# Patient Record
Sex: FEMALE | Race: WHITE | NOT HISPANIC OR LATINO | Employment: PART TIME | URBAN - METROPOLITAN AREA
[De-identification: names, ages, dates, MRNs, and addresses within clinical notes are randomized per-mention and may not be internally consistent; named-entity substitution may affect disease eponyms.]

---

## 2024-04-01 ENCOUNTER — HOSPITAL ENCOUNTER (EMERGENCY)
Facility: HOSPITAL | Age: 59
Discharge: HOME | End: 2024-04-01
Attending: EMERGENCY MEDICINE | Admitting: EMERGENCY MEDICINE
Payer: COMMERCIAL

## 2024-04-01 ENCOUNTER — APPOINTMENT (OUTPATIENT)
Dept: GENERAL RADIOLOGY | Facility: HOSPITAL | Age: 59
End: 2024-04-01
Payer: COMMERCIAL

## 2024-04-01 VITALS
HEIGHT: 69 IN | OXYGEN SATURATION: 94 % | RESPIRATION RATE: 18 BRPM | TEMPERATURE: 97.1 F | SYSTOLIC BLOOD PRESSURE: 154 MMHG | WEIGHT: 165 LBS | DIASTOLIC BLOOD PRESSURE: 90 MMHG | HEART RATE: 69 BPM | BODY MASS INDEX: 24.44 KG/M2

## 2024-04-01 DIAGNOSIS — S62.102A LEFT WRIST FRACTURE, CLOSED, INITIAL ENCOUNTER: Primary | ICD-10-CM

## 2024-04-01 PROCEDURE — 25810000003 SODIUM CHLORIDE 0.9 % SOLUTION: Performed by: EMERGENCY MEDICINE

## 2024-04-01 PROCEDURE — 73100 X-RAY EXAM OF WRIST: CPT

## 2024-04-01 PROCEDURE — 25010000002 FENTANYL CITRATE (PF) 50 MCG/ML SOLUTION: Performed by: EMERGENCY MEDICINE

## 2024-04-01 PROCEDURE — 96374 THER/PROPH/DIAG INJ IV PUSH: CPT

## 2024-04-01 PROCEDURE — 99285 EMERGENCY DEPT VISIT HI MDM: CPT

## 2024-04-01 PROCEDURE — 25010000002 PROPOFOL 10 MG/ML EMULSION: Performed by: EMERGENCY MEDICINE

## 2024-04-01 PROCEDURE — 73110 X-RAY EXAM OF WRIST: CPT

## 2024-04-01 RX ORDER — HYDROCODONE BITARTRATE AND ACETAMINOPHEN 5; 325 MG/1; MG/1
1 TABLET ORAL EVERY 6 HOURS PRN
Qty: 12 TABLET | Refills: 0 | Status: SHIPPED | OUTPATIENT
Start: 2024-04-01

## 2024-04-01 RX ORDER — PROPOFOL 10 MG/ML
40 VIAL (ML) INTRAVENOUS ONCE
Status: DISCONTINUED | OUTPATIENT
Start: 2024-04-01 | End: 2024-04-01 | Stop reason: HOSPADM

## 2024-04-01 RX ORDER — PROPOFOL 10 MG/ML
VIAL (ML) INTRAVENOUS
Status: COMPLETED | OUTPATIENT
Start: 2024-04-01 | End: 2024-04-01

## 2024-04-01 RX ORDER — FENTANYL CITRATE 50 UG/ML
50 INJECTION, SOLUTION INTRAMUSCULAR; INTRAVENOUS ONCE
Status: COMPLETED | OUTPATIENT
Start: 2024-04-01 | End: 2024-04-01

## 2024-04-01 RX ADMIN — FENTANYL CITRATE 50 MCG: 50 INJECTION, SOLUTION INTRAMUSCULAR; INTRAVENOUS at 13:45

## 2024-04-01 RX ADMIN — FENTANYL CITRATE 50 MCG: 50 INJECTION, SOLUTION INTRAMUSCULAR; INTRAVENOUS at 13:18

## 2024-04-01 RX ADMIN — PROPOFOL 20 MG: 10 INJECTION, EMULSION INTRAVENOUS at 14:25

## 2024-04-01 RX ADMIN — PROPOFOL 40 MG: 10 INJECTION, EMULSION INTRAVENOUS at 14:23

## 2024-04-01 RX ADMIN — SODIUM CHLORIDE 1000 ML: 9 INJECTION, SOLUTION INTRAVENOUS at 14:10

## 2024-04-01 NOTE — ED PROVIDER NOTES
EMERGENCY DEPARTMENT ENCOUNTER    Room Number:  40/40  PCP: Marilu Crain APRN  Historian: Patient      HPI:  Chief Complaint: Left wrist pain  A complete HPI/ROS/PMH/PSH/SH/FH are unobtainable due to: None    Context: Andria Downey is a 58 y.o. female who presents to the ED via EMS from home c/o acute left wrist pain after she fell and pressures over the left hand.  Deformity noted per EMS.  No pain meds given prior to arrival.  Patient denies any other injuries or concerns.  Denies being on any anticoagulation.      MEDICAL RECORD REVIEW    External (non-ED) record review: No medication allergies listed on chart review in epic          BLAZE query complete and reviewed. Patient receives regular prescriptions for controlled substances. She receives Valium for anxiety, discussed not mixing opioids with valium.     PAST MEDICAL HISTORY  Active Ambulatory Problems     Diagnosis Date Noted    No Active Ambulatory Problems     Resolved Ambulatory Problems     Diagnosis Date Noted    No Resolved Ambulatory Problems     No Additional Past Medical History         PAST SURGICAL HISTORY  No past surgical history on file.      FAMILY HISTORY  No family history on file.      SOCIAL HISTORY  Social History     Socioeconomic History    Marital status:          ALLERGIES  Patient has no known allergies.        REVIEW OF SYSTEMS  Review of Systems     All systems reviewed and negative except for those discussed in HPI.       PHYSICAL EXAM    I have reviewed the triage vital signs and nursing notes.    ED Triage Vitals   Temp Heart Rate Resp BP SpO2   04/01/24 1256 04/01/24 1254 04/01/24 1254 04/01/24 1254 04/01/24 1254   97.1 °F (36.2 °C) 59 18 (!) 138/112 96 %      Temp src Heart Rate Source Patient Position BP Location FiO2 (%)   -- -- -- -- --              Physical Exam  General: No acute distress, nontoxic  HEENT: Mucous membranes moist, atraumatic, EOMI  Neck: Full ROM  Pulm: Symmetric chest rise, nonlabored,  lungs CTAB  Cardiovascular: Regular rate and rhythm, intact distal pulses  MSK: Left wrist edema and deformity dorsally with dorsal hematoma  Skin: Warm, dry  Neuro: Awake, alert, oriented x 4, GCS 15, neurovascular intact distal left upper extremity, moving all extremities, no focal deficits  Psych: Calm, cooperative        LAB RESULTS  No results found for this or any previous visit (from the past 24 hour(s)).    Ordered the above labs and independently interpreted results. My findings will be discussed in the medical decision making section below        RADIOLOGY  XR Wrist 3+ View Left    Result Date: 4/1/2024  XR WRIST 3+ VW LEFT-  Clinical: Post reduction, fell  COMPARISON examination earlier in the day at 1:46 p.m., current examination at 2:36 p.m.  FINDINGS: There is near anatomic alignment demonstrated at the distal ulnar fracture site. Considerably improved alignment of the radial fracture fragment. No carpal bone fracture or dissociation seen. First carpometacarpal joint degeneration. There is casting material in position. The remainder is unremarkable.  This report was finalized on 4/1/2024 2:52 PM by Dr. Howard Young M.D on Workstation: [id number]      XR Wrist 2 View Left    Result Date: 4/1/2024  2 VIEW LEFT WRIST  HISTORY: Fell. Pain.  There is a fracture of the distal radial metaphysis with complete volar displacement of the epiphysis relative to the radial shaft. There is also a nondisplaced fracture at the junction of the distal shaft of the ulna with the ulnar metaphysis.  This report was finalized on 4/1/2024 2:03 PM by Dr. Willi Pimentel M.D on Workstation: [id number]       Ordered the above noted radiological studies.  Independently interpreted by me and my independent review of findings can be found in the ED Course.  See dictation for official radiology interpretation.      PROCEDURES    Procedural Sedation    Date/Time: 4/1/2024 2:47 PM    Performed by: Zack Curry MD  Authorized by:  Zack Curry MD    Consent:     Consent obtained:  Verbal and written    Consent given by:  Patient    Risks, benefits, and alternatives were discussed: yes      Risks discussed:  Allergic reaction, dysrhythmia, inadequate sedation, prolonged hypoxia resulting in organ damage, prolonged sedation necessitating reversal, nausea, respiratory compromise necessitating ventilatory assistance and intubation and vomiting    Alternatives discussed:  Analgesia without sedation  Universal protocol:     Procedure explained and questions answered to patient or proxy's satisfaction: yes      Relevant documents present and verified: yes      Test results available: yes      Imaging studies available: yes      Required blood products, implants, devices, and special equipment available: yes      Immediately prior to procedure, a time out was called: yes      Patient identity confirmed:  Verbally with patient  Indications:     Procedure performed:  Fracture reduction    Procedure necessitating sedation performed by: MARGARITO Rios.    Intended level of sedation:  Moderate  Pre-sedation assessment:     NPO status caution: urgency dictates proceeding with non-ideal NPO status      ASA classification: class 1 - normal, healthy patient      Mallampati score:  II - soft palate, uvula, fauces visible    Neck mobility: normal      Pre-sedation assessments completed and reviewed: airway patency, anesthesia/sedation history, cardiovascular function, hydration status, mental status, nausea/vomiting, pain level, respiratory function and temperature      History of difficult intubation: no      Pre-sedation assessment completed:  4/1/2024 2:10 PM  Immediate pre-procedure details:     Reassessment: Patient reassessed immediately prior to procedure      Reviewed: vital signs and relevant labs/tests      Verified: bag valve mask available, emergency equipment available, intubation equipment available, IV patency confirmed, oxygen available,  reversal medications available and suction available    Procedure details (see MAR for exact dosages):     Sedation start time:  4/1/2024 2:23 PM    Preoxygenation:  Nasal cannula    Sedation:  Propofol    Analgesia:  Fentanyl    Intra-procedure monitoring:  Blood pressure monitoring, cardiac monitor, continuous capnometry, continuous pulse oximetry, frequent LOC assessments and frequent vital sign checks    Intra-procedure events: none      Sedation end time:  4/1/2024 2:35 PM    Total sedation time (minutes):  12  Post-procedure details:     Post-sedation assessment completed:  4/1/2024 2:49 PM    Attendance: Constant attendance by certified staff until patient recovered      Recovery: Patient returned to pre-procedure baseline      Complications:  None    Post-sedation assessments completed and reviewed: airway patency, cardiovascular function, hydration status, mental status, nausea/vomiting, pain level and respiratory function      Patient is stable for discharge or admission: yes      Procedure completion:  Tolerated well, no immediate complications          MEDICATIONS GIVEN IN ER    Medications   Propofol (DIPRIVAN) injection 40 mg (40 mg Intravenous Not Given 4/1/24 1432)   fentaNYL citrate (PF) (SUBLIMAZE) injection 50 mcg (50 mcg Intravenous Given 4/1/24 1318)   fentaNYL citrate (PF) (SUBLIMAZE) injection 50 mcg (50 mcg Intravenous Given 4/1/24 1345)   sodium chloride 0.9 % bolus 1,000 mL (1,000 mL Intravenous New Bag 4/1/24 1410)   Propofol (DIPRIVAN) injection (20 mg Intravenous Given 4/1/24 1425)         PROGRESS, DATA ANALYSIS, CONSULTS, AND MEDICAL DECISION MAKING    Please note that this section constitutes my independent interpretation of clinical data including lab results, radiology, EKG's.  This constitutes my independent professional opinion regarding differential diagnosis and management of this patient.  It may include any factors such as history from outside sources, review of external  records, social determinants of health, management of medications, response to those treatments, and discussions with other providers.    Differential Diagnosis and Plan: Initial concern for left wrist fracture/dislocation/soft tissue contusion.  Plan for pain control, x-ray, and reevaluation with results.    Additional sources:  - Discussed/ obtained information from independent historians: EMS reports they did not give any pain medicines prior to arrival     - (Social Determinants of Health): None     - Shared decision making:  Patient fully updated on and in agreement with the course and plan moving forward    ED Course as of 04/01/24 1517   Mon Apr 01, 2024   1352 XR Wrist 2 View Left  Per my independent interpretation of the left wrist x-ray, displaced distal radius fracture with likely a distal ulnar fracture as well [DC]   1442 XR Wrist 3+ View Left  Per my independent interpretation of the postreduction x-ray, much improved alignment [DC]   1450 Patient has emerged from sedation with no issues, she is well-appearing in no acute distress with improved pain.  Safe for discharge with outpatient orthopedic follow-up.  ED return for worsening symptoms as needed. [DC]      ED Course User Index  [DC] Zack Curry MD       Hospitalization Considered?:     Orders Placed During This Visit:  Orders Placed This Encounter   Procedures    FX Dislocation Initial    Procedural Sedation    [facility] D[facility] 02.  Shoulder Immobilizer/Sling    XR Wrist 2 View Left    XR Wrist 3+ View Left       Additional orders considered but not placed:      Independent interpretation of labs, radiology studies, and discussions with consultants: See ED Course        AS OF 15:17 EDT VITALS:    BP - 128/87  HR - 85  TEMP - 97.1 °F (36.2 °C)  02 SATS - 96%          DIAGNOSIS  Final diagnoses:   Left wrist fracture, closed, initial encounter         DISPOSITION  ED Disposition       ED Disposition   Discharge    Condition   Stable     Comment   --                Please note that portions of this document were completed with a voice recognition program.    Note Disclaimer: At [facility], we believe that sharing information builds trust and better relationships. You are receiving this note because you recently visited [facility]. It is possible you will see health information before a provider has talked with you about it. This kind of information can be easy to misunderstand. To help you fully understand what it means for your health, we urge you to discuss this note with your provider.                       Zack Curry MD  04/01/24 1072

## 2024-04-01 NOTE — ED PROVIDER NOTES
FX Dislocation    Date/Time: 4/1/2024 2:36 PM    Performed by: Salinas Camap PA  Authorized by: Zack Curry MD    Consent:     Consent obtained:  Verbal    Consent given by:  Patient    Risks, benefits, and alternatives were discussed: yes      Risks discussed:  Nerve damage and pain    Alternatives discussed:  No treatment  Universal protocol:     Imaging studies available: yes      Patient identity confirmed:  Verbally with patient  Injury:     Injury location:  Forearm    Forearm fracture type: distal radius and ulnar styloid    Pre-procedure details:     Distal neurologic exam:  Normal    Distal perfusion: distal pulses strong      Range of motion: reduced    Sedation:     Sedation type:  Moderate sedation  Anesthesia:     Anesthesia method:  None  Procedure details:     Skeletal traction used: yes      Immobilization:  Splint    Splint type:  Sugar tong    Supplies used:  Elastic bandage and fiberglass    Attestation: Splint applied and adjusted personally by me    Post-procedure details:     Distal neurologic exam:  Normal    Distal perfusion: distal pulses strong and brisk capillary refill      Range of motion: unchanged      Procedure completion:  Tolerated well, no immediate complications         Salinas Campa PA  04/01/24 1435

## 2024-04-01 NOTE — ED TRIAGE NOTES
Patient to ED via EMS from home. Patient had a mechanical fall and braced herself with her left hand. Patient has left wrist injury. Patient denies hitting head or LOC.

## 2024-04-01 NOTE — DISCHARGE INSTRUCTIONS
Take pain medications as needed for severe pain, do not mix with Valium, follow-up with orthopedics as discussed, PCP follow-up as needed, ED return for worsening symptoms as needed.

## 2025-03-10 NOTE — PROGRESS NOTES
"Chief Complaint  Colon Cancer Screening    Subjective        Andria Downey is a 59 year-old female, new to me and to our practice.  She was referred to our GI service for colorectal cancer screening     History of Present Illness  The patient is a 59-year-old female who presents as a new patient for colorectal cancer screening.    She was referred to our facility by her primary care physician for a colonoscopy screening. She has previously undergone a Cologuard study a few years ago but has not had a colonoscopy in a long time. She reports no family history of colon cancer and no changes in bowel movements such as hard stools, constipation, loose stools, or rectal bleeding. Her stools are slightly soft. She also reports no abdominal pain, nausea, vomiting, or unintentional weight loss. She is currently attempting to lose 10 pounds prior to returning to [place]. She does not consume alcohol.    She has a history of COPD and smokes daily. She also vapes and smokes marijuana.    She has a history of hypertension and is taking lisinopril.    SOCIAL HISTORY  She works as a . She admits to smoking and vaping daily. She also smokes marijuana. She does not consume alcohol.    FAMILY HISTORY  She reports no family history of colon cancer.    MEDICATIONS  Current: lisinopril       Results Reviewed:  (+) cologuard in 2023 - Colonoscopy was scheduled but patient canceled.     Objective   Vital Signs:  /80   Temp 98.2 °F (36.8 °C)   Ht 175.3 cm (69\")   Wt 77.8 kg (171 lb 8 oz)   BMI 25.33 kg/m²   Estimated body mass index is 25.33 kg/m² as calculated from the following:    Height as of this encounter: 175.3 cm (69\").    Weight as of this encounter: 77.8 kg (171 lb 8 oz).       BMI is within normal parameters. No other follow-up for BMI required.      Physical Exam  Vitals and nursing note reviewed.   Constitutional:       General: She is not in acute distress.     Appearance: Normal appearance. She is normal weight. " She is not ill-appearing, toxic-appearing or diaphoretic.   Eyes:      General: No scleral icterus.     Conjunctiva/sclera: Conjunctivae normal.   Pulmonary:      Effort: Pulmonary effort is normal.   Abdominal:      General: Abdomen is flat. Bowel sounds are normal. There is no distension.      Palpations: Abdomen is soft. There is no mass.      Tenderness: There is no abdominal tenderness. There is no right CVA tenderness, left CVA tenderness, guarding or rebound.      Hernia: No hernia is present.   Skin:     Coloration: Skin is not jaundiced or pale.      Findings: No erythema or rash.   Neurological:      Mental Status: She is alert and oriented to person, place, and time. Mental status is at baseline.   Psychiatric:         Mood and Affect: Mood normal.                 Assessment and Plan     Diagnoses and all orders for this visit:    1. Encounter for screening for malignant neoplasm of colon (Primary)    2. Chronic obstructive pulmonary disease, unspecified COPD type      Assessment & Plan  1. Colorectal cancer screening.  She has previously canceled colonoscopies but is agreeable to this time because she had a positive Cologuard test in 2023.   A colonoscopy has been recommended for further evaluation.  She has been advised to maintain a high-fiber diet and avoid greasy and spicy foods.    2. Chronic obstructive pulmonary disease (COPD).  She has a history of COPD and continues to smoke and vape daily. Smoking cessation was discussed, and she was encouraged to consider quitting.    3. Hypertension.  She has a history of hypertension and is currently taking lisinopril.     I have reviewed patient's current medications list, relevant clinical information necessary for today's encounter. I discussed the clinical impression and treatment plan with the patient, who verbalized understanding and in agreement.  All questions were answered and support was provided.    EMR Dragon/Transcription Disclaimer:  This  document has been Dictated utilizing Dragon dictation.     Patient or patient representative verbalized consent for the use of Ambient Listening during the visit with  GREGORY Roth for chart documentation. 3/13/2025  12:04 EDT

## 2025-03-13 ENCOUNTER — TELEPHONE (OUTPATIENT)
Dept: GASTROENTEROLOGY | Facility: CLINIC | Age: 60
End: 2025-03-13
Payer: COMMERCIAL

## 2025-03-13 ENCOUNTER — OFFICE VISIT (OUTPATIENT)
Dept: GASTROENTEROLOGY | Facility: CLINIC | Age: 60
End: 2025-03-13
Payer: COMMERCIAL

## 2025-03-13 ENCOUNTER — PREP FOR SURGERY (OUTPATIENT)
Dept: SURGERY | Facility: SURGERY CENTER | Age: 60
End: 2025-03-13
Payer: COMMERCIAL

## 2025-03-13 VITALS
SYSTOLIC BLOOD PRESSURE: 142 MMHG | DIASTOLIC BLOOD PRESSURE: 80 MMHG | BODY MASS INDEX: 25.4 KG/M2 | TEMPERATURE: 98.2 F | HEIGHT: 69 IN | WEIGHT: 171.5 LBS

## 2025-03-13 DIAGNOSIS — Z12.11 ENCOUNTER FOR SCREENING FOR MALIGNANT NEOPLASM OF COLON: Primary | ICD-10-CM

## 2025-03-13 DIAGNOSIS — R19.5 POSITIVE COLORECTAL CANCER SCREENING USING COLOGUARD TEST: ICD-10-CM

## 2025-03-13 DIAGNOSIS — J44.9 CHRONIC OBSTRUCTIVE PULMONARY DISEASE, UNSPECIFIED COPD TYPE: ICD-10-CM

## 2025-03-13 DIAGNOSIS — Z12.11 ENCOUNTER FOR SCREENING COLONOSCOPY: Primary | ICD-10-CM

## 2025-03-13 RX ORDER — DIAZEPAM 5 MG/1
TABLET ORAL
COMMUNITY

## 2025-03-13 RX ORDER — EPINEPHRINE 0.3 MG/.3ML
INJECTION SUBCUTANEOUS
COMMUNITY

## 2025-03-13 RX ORDER — AMLODIPINE BESYLATE 5 MG/1
5 TABLET ORAL DAILY
COMMUNITY

## 2025-03-13 RX ORDER — SODIUM CHLORIDE 0.9 % (FLUSH) 0.9 %
10 SYRINGE (ML) INJECTION AS NEEDED
OUTPATIENT
Start: 2025-03-13

## 2025-03-13 RX ORDER — SODIUM CHLORIDE 0.9 % (FLUSH) 0.9 %
3 SYRINGE (ML) INJECTION EVERY 12 HOURS SCHEDULED
OUTPATIENT
Start: 2025-03-13

## 2025-03-13 RX ORDER — HYDROCHLOROTHIAZIDE 12.5 MG/1
1 TABLET ORAL DAILY
COMMUNITY

## 2025-03-13 RX ORDER — ALBUTEROL SULFATE 0.83 MG/ML
1 SOLUTION RESPIRATORY (INHALATION)
COMMUNITY

## 2025-03-13 RX ORDER — FLUTICASONE FUROATE, UMECLIDINIUM BROMIDE AND VILANTEROL TRIFENATATE 100; 62.5; 25 UG/1; UG/1; UG/1
POWDER RESPIRATORY (INHALATION)
COMMUNITY
Start: 2024-11-25

## 2025-03-13 RX ORDER — SODIUM CHLORIDE, SODIUM LACTATE, POTASSIUM CHLORIDE, CALCIUM CHLORIDE 600; 310; 30; 20 MG/100ML; MG/100ML; MG/100ML; MG/100ML
30 INJECTION, SOLUTION INTRAVENOUS ONCE
OUTPATIENT
Start: 2025-03-13

## 2025-03-13 RX ORDER — LISINOPRIL 40 MG/1
1 TABLET ORAL DAILY
COMMUNITY

## 2025-03-13 RX ORDER — ALBUTEROL SULFATE 90 UG/1
INHALANT RESPIRATORY (INHALATION)
COMMUNITY

## 2025-03-13 NOTE — TELEPHONE ENCOUNTER
Hub staff attempted to follow warm transfer process and was unsuccessful     Caller: Andria Downey    Relationship to patient: Self    Best call back number: [phone]      Patient is needing: PT NEEDS TO RESCHEDULE HER PROCEDURE 5-13-25 WILL HAVE NO RIDE. PLEASE CALL PT TO DISCUSS.

## 2025-05-19 ENCOUNTER — TELEPHONE (OUTPATIENT)
Dept: GASTROENTEROLOGY | Facility: CLINIC | Age: 60
End: 2025-05-19
Payer: COMMERCIAL

## 2025-05-19 NOTE — TELEPHONE ENCOUNTER
Hub staff attempted to follow warm transfer process and was unsuccessful     Caller: Andria Downey    Relationship to patient: Self    Best call back number: [phone]'    Patient is needing: PT RETURNING PHONE CALL, ALSO LOST THE PREP INSTRUCTIONS, PLS CALL PT TO DISCUSS. PROCEDURE 5-20-25.

## 2025-05-20 ENCOUNTER — ANESTHESIA (OUTPATIENT)
Dept: SURGERY | Facility: SURGERY CENTER | Age: 60
End: 2025-05-20
Payer: COMMERCIAL

## 2025-05-20 ENCOUNTER — HOSPITAL ENCOUNTER (OUTPATIENT)
Facility: SURGERY CENTER | Age: 60
Setting detail: HOSPITAL OUTPATIENT SURGERY
Discharge: HOME | End: 2025-05-20
Attending: INTERNAL MEDICINE | Admitting: INTERNAL MEDICINE
Payer: COMMERCIAL

## 2025-05-20 ENCOUNTER — ANESTHESIA EVENT (OUTPATIENT)
Dept: SURGERY | Facility: SURGERY CENTER | Age: 60
End: 2025-05-20
Payer: COMMERCIAL

## 2025-05-20 VITALS
RESPIRATION RATE: 18 BRPM | WEIGHT: 167.8 LBS | TEMPERATURE: 97.7 F | OXYGEN SATURATION: 96 % | DIASTOLIC BLOOD PRESSURE: 80 MMHG | SYSTOLIC BLOOD PRESSURE: 124 MMHG | BODY MASS INDEX: 24.85 KG/M2 | HEART RATE: 90 BPM | HEIGHT: 69 IN

## 2025-05-20 DIAGNOSIS — R19.5 POSITIVE COLORECTAL CANCER SCREENING USING COLOGUARD TEST: ICD-10-CM

## 2025-05-20 DIAGNOSIS — Z12.11 ENCOUNTER FOR SCREENING COLONOSCOPY: ICD-10-CM

## 2025-05-20 PROCEDURE — 25010000002 PROPOFOL 10 MG/ML EMULSION

## 2025-05-20 PROCEDURE — 25010000002 LIDOCAINE 1 % SOLUTION

## 2025-05-20 PROCEDURE — 25010000002 PROPOFOL 1000 MG/100ML EMULSION

## 2025-05-20 PROCEDURE — 25810000003 LACTATED RINGERS PER 1000 ML: Performed by: NURSE PRACTITIONER

## 2025-05-20 PROCEDURE — 88305 TISSUE EXAM BY PATHOLOGIST: CPT | Performed by: INTERNAL MEDICINE

## 2025-05-20 PROCEDURE — 45385 COLONOSCOPY W/LESION REMOVAL: CPT | Performed by: INTERNAL MEDICINE

## 2025-05-20 PROCEDURE — 25810000003 LACTATED RINGERS PER 1000 ML

## 2025-05-20 RX ORDER — SODIUM CHLORIDE 0.9 % (FLUSH) 0.9 %
3 SYRINGE (ML) INJECTION EVERY 12 HOURS SCHEDULED
Status: DISCONTINUED | OUTPATIENT
Start: 2025-05-20 | End: 2025-05-20 | Stop reason: HOSPADM

## 2025-05-20 RX ORDER — SODIUM CHLORIDE, SODIUM LACTATE, POTASSIUM CHLORIDE, CALCIUM CHLORIDE 600; 310; 30; 20 MG/100ML; MG/100ML; MG/100ML; MG/100ML
30 INJECTION, SOLUTION INTRAVENOUS ONCE
Status: COMPLETED | OUTPATIENT
Start: 2025-05-20 | End: 2025-05-20

## 2025-05-20 RX ORDER — SODIUM CHLORIDE 0.9 % (FLUSH) 0.9 %
10 SYRINGE (ML) INJECTION AS NEEDED
Status: DISCONTINUED | OUTPATIENT
Start: 2025-05-20 | End: 2025-05-20 | Stop reason: HOSPADM

## 2025-05-20 RX ORDER — LIDOCAINE HYDROCHLORIDE 10 MG/ML
INJECTION, SOLUTION INFILTRATION; PERINEURAL AS NEEDED
Status: DISCONTINUED | OUTPATIENT
Start: 2025-05-20 | End: 2025-05-20 | Stop reason: SURG

## 2025-05-20 RX ORDER — PROPOFOL 10 MG/ML
INJECTION, EMULSION INTRAVENOUS AS NEEDED
Status: DISCONTINUED | OUTPATIENT
Start: 2025-05-20 | End: 2025-05-20 | Stop reason: SURG

## 2025-05-20 RX ORDER — SODIUM CHLORIDE, SODIUM LACTATE, POTASSIUM CHLORIDE, CALCIUM CHLORIDE 600; 310; 30; 20 MG/100ML; MG/100ML; MG/100ML; MG/100ML
INJECTION, SOLUTION INTRAVENOUS CONTINUOUS PRN
Status: DISCONTINUED | OUTPATIENT
Start: 2025-05-20 | End: 2025-05-20 | Stop reason: SURG

## 2025-05-20 RX ADMIN — PROPOFOL 50 MG: 10 INJECTION, EMULSION INTRAVENOUS at 08:19

## 2025-05-20 RX ADMIN — SODIUM CHLORIDE, POTASSIUM CHLORIDE, SODIUM LACTATE AND CALCIUM CHLORIDE: 600; 310; 30; 20 INJECTION, SOLUTION INTRAVENOUS at 08:14

## 2025-05-20 RX ADMIN — PROPOFOL 150 MCG/KG/MIN: 10 INJECTION, EMULSION INTRAVENOUS at 08:23

## 2025-05-20 RX ADMIN — SODIUM CHLORIDE, POTASSIUM CHLORIDE, SODIUM LACTATE AND CALCIUM CHLORIDE 30 ML/HR: 600; 310; 30; 20 INJECTION, SOLUTION INTRAVENOUS at 07:41

## 2025-05-20 RX ADMIN — LIDOCAINE HYDROCHLORIDE 30 MG: 10 INJECTION, SOLUTION INFILTRATION; PERINEURAL at 08:17

## 2025-05-20 RX ADMIN — PROPOFOL 100 MG: 10 INJECTION, EMULSION INTRAVENOUS at 08:17

## 2025-05-20 RX ADMIN — PROPOFOL 50 MG: 10 INJECTION, EMULSION INTRAVENOUS at 08:24

## 2025-05-20 NOTE — ANESTHESIA PREPROCEDURE EVALUATION
Anesthesia Evaluation     Patient summary reviewed and Nursing notes reviewed   no history of anesthetic complications:   NPO Solid Status: > 8 hours  NPO Liquid Status: > 8 hours           Airway   Mallampati: II  TM distance: >3 FB  Neck ROM: full  No difficulty expected  Dental - normal exam     Pulmonary    (+) a smoker Former, COPD (used maintenance inhaler today) mild,  (-) asthma, sleep apnea, rhonchi, decreased breath sounds, wheezes  Cardiovascular   Exercise tolerance: good (4-7 METS)    Rhythm: regular  Rate: normal    (+) hypertension  (-) CAD, dysrhythmias, angina, CARLOS, murmur      Neuro/Psych  (-) seizures, CVA  GI/Hepatic/Renal/Endo    (-) liver disease, no renal disease, diabetes, no thyroid disorder    Musculoskeletal     Abdominal     Abdomen: soft.   Substance History      OB/GYN          Other                    Anesthesia Plan    ASA 2     MAC   total IV anesthesia  intravenous induction     Anesthetic plan, risks, benefits, and alternatives have been provided, discussed and informed consent has been obtained with: patient.    CODE STATUS:

## 2025-05-20 NOTE — H&P
No chief complaint on file.      HPI  Patient today for screening colonoscopy.  She had a positive Cologuard.         Problem List:    Patient Active Problem List   Diagnosis    Encounter for screening colonoscopy    Positive colorectal cancer screening using Cologuard test       Medical History:    Past Medical History:   Diagnosis Date    Hypertension         Social History:    Social History     Socioeconomic History    Marital status:    Tobacco Use    Smoking status: Former     Types: Cigarettes    Smokeless tobacco: Never   Vaping Use    Vaping status: Every Day    Substances: Nicotine, Flavoring    Devices: Disposable   Substance and Sexual Activity    Alcohol use: Not Currently    Drug use: Yes     Types: Marijuana    Sexual activity: Defer       Family History:   Family History   Problem Relation Age of Onset    Colon cancer Neg Hx     Colon polyps Neg Hx     Crohn's disease Neg Hx     Irritable bowel syndrome Neg Hx     Ulcerative colitis Neg Hx        Surgical History: No past surgical history on file.      Current Facility-Administered Medications:     lactated ringers infusion, 30 mL/hr, Intravenous, Once, Burgess, Qasim Tram, APRN    sodium chloride 0.9 % flush 10 mL, 10 mL, Intravenous, PRN, Burgess, Qasim Tram, APRN    sodium chloride 0.9 % flush 3 mL, 3 mL, Intravenous, Q12H, Burgess, Qasim Tram, APRN    Allergies:   Allergies   Allergen Reactions    Bee Venom Anaphylaxis        The following portions of the patient's history were reviewed by me and updated as appropriate: review of systems, allergies, current medications, past family history, past medical history, past social history, past surgical history and problem list.    There were no vitals filed for this visit.    PHYSICAL EXAM:    CONSTITUTIONAL:  today's vital signs reviewed by me  GASTROINTESTINAL: abdomen is soft nontender nondistended with normal active bowel sounds, no masses are appreciated    Assessment/ Plan  We will proceed today  with colonoscopy.    Risks and benefits as well as alternatives to endoscopic evaluation were explained to the patient and they voiced understanding and wish to proceed.  These risks include but are not limited to the risk of bleeding, perforation, adverse reaction to sedation, and missed lesions.  The patient was given the opportunity to ask questions prior to the endoscopic procedure.

## 2025-05-20 NOTE — ANESTHESIA POSTPROCEDURE EVALUATION
"Patient: Andria Downey    Procedure Summary       Date: 05/20/25 Room / Location: [facility] / [facility]    Anesthesia Start: 0814 Anesthesia Stop: 0842    Procedure: COLONOSCOPY to Cecum with Polypectomy Diagnosis:       Encounter for screening colonoscopy      Positive colorectal cancer screening using Cologuard test      (Encounter for screening colonoscopy [Z12.11])      (Positive colorectal cancer screening using Cologuard test [R19.5])    Surgeons: Luke Millan MD Provider: Joe Cortés MD    Anesthesia Type: MAC ASA Status: 2            Anesthesia Type: MAC    Vitals  Vitals Value Taken Time   BP 86/73 05/20/25 09:02   Temp 36.5 °C (97.7 °F) 05/20/25 08:41   Pulse 90 05/20/25 08:54   Resp 18 05/20/25 08:54   SpO2 93 % 05/20/25 08:55   Vitals shown include unfiled device data.        Post Anesthesia Care and Evaluation    Level of consciousness: awake and alert  Pain management: adequate    Airway patency: patent  Anesthetic complications: No anesthetic complications  PONV Status: none  Cardiovascular status: acceptable  Respiratory status: acceptable  Hydration status: acceptable    Comments: /80   Pulse 90   Temp 36.5 °C (97.7 °F)   Resp 18   Ht 175.3 cm (69\")   Wt 76.1 kg (167 lb 12.8 oz)   SpO2 96%   BMI 24.78 kg/m²       "

## 2025-05-21 LAB
CYTO UR: NORMAL
LAB AP CASE REPORT: NORMAL
PATH REPORT.FINAL DX SPEC: NORMAL
PATH REPORT.GROSS SPEC: NORMAL

## (undated) DEVICE — CANN O2 ETCO2 FITS ALL CONN CO2 SMPL A/ 7IN DISP LF

## (undated) DEVICE — MSK ENDO PORT O2 POM ELITE CURAPLEX A/

## (undated) DEVICE — SYRINGE, LUER SLIP, STERILE, 60ML: Brand: MEDLINE

## (undated) DEVICE — ADAPT CLN SCPE ENDO PORPOISE BX/50 DISP

## (undated) DEVICE — GOWN,SIRUS,NONRNF,3XL,18/CS: Brand: MEDLINE

## (undated) DEVICE — FLEX ADVANTAGE 1500CC: Brand: FLEX ADVANTAGE

## (undated) DEVICE — SINGLE-USE BIOPSY FORCEPS: Brand: RADIAL JAW 4

## (undated) DEVICE — VIAL FORMLN CAP 10PCT 20ML

## (undated) DEVICE — GOWN ISOL W/THUMB UNIV BLU BX/15

## (undated) DEVICE — Device

## (undated) DEVICE — SNAR POLYP CAPTIVATOR RND STFF 2.4 240CM 10MM 1P/U

## (undated) DEVICE — KT ORCA ORCAPOD DISP STRL